# Patient Record
Sex: FEMALE | Race: WHITE | URBAN - METROPOLITAN AREA
[De-identification: names, ages, dates, MRNs, and addresses within clinical notes are randomized per-mention and may not be internally consistent; named-entity substitution may affect disease eponyms.]

---

## 2021-02-26 ENCOUNTER — APPOINTMENT (RX ONLY)
Dept: URBAN - METROPOLITAN AREA CLINIC 167 | Facility: CLINIC | Age: 41
Setting detail: DERMATOLOGY
End: 2021-02-26

## 2021-02-26 DIAGNOSIS — L85.3 XEROSIS CUTIS: ICD-10-CM

## 2021-02-26 PROCEDURE — ? COUNSELING

## 2021-02-26 PROCEDURE — ? TREATMENT REGIMEN

## 2021-02-26 PROCEDURE — 99202 OFFICE O/P NEW SF 15 MIN: CPT

## 2021-02-26 ASSESSMENT — LOCATION DETAILED DESCRIPTION DERM
LOCATION DETAILED: RIGHT MEDIAL PLANTAR HEEL
LOCATION DETAILED: LEFT LATERAL PLANTAR HEEL

## 2021-02-26 ASSESSMENT — LOCATION ZONE DERM: LOCATION ZONE: FEET

## 2021-02-26 ASSESSMENT — LOCATION SIMPLE DESCRIPTION DERM
LOCATION SIMPLE: LEFT PLANTAR SURFACE
LOCATION SIMPLE: RIGHT PLANTAR SURFACE

## 2021-02-26 NOTE — PROCEDURE: TREATMENT REGIMEN
Detail Level: Zone
Samples Given: Eucerin roughness relief lotion
Discontinue Regimen: Callus removers and scraping
Initiate Treatment: Moisturize feet daily with Aveeno Cracked Skin or Eucerin Roughness Relief nightly with occlusion